# Patient Record
(demographics unavailable — no encounter records)

---

## 2024-10-31 NOTE — ASSESSMENT
[FreeTextEntry1] : Patient is a 76-year-old male with history of dilated, cardiomyopathy of unclear etiology, left ventricular dysfunction with reduced ejection fraction, V-fib status post defibrillator, history of ulcerative colitis, history of hypertension, history of prediabetes, achalasia, who presents for follow-up  1.  Cardiomyopathy/recent left ventricular dysfunction with reduced ejection fraction Clinically doing well check electrolytes BMP follow-up with cardiology Recent echo showed  Continue Entresto carvedilol and spironolactone   2.  V-fib Status post defibrillator continue mexiletine 150 mg once a day  #3 history of achalasia Asymptomatic continue pantoprazole 40 mg once a day  5 history of ulcerative colitis Asymptomatic colonoscopy negative  6.  Health maintenance Prevnar 20 and high-dose flu shot today check labs follow-up in 3 to 4 months

## 2024-10-31 NOTE — HISTORY OF PRESENT ILLNESS
[FreeTextEntry1] : Follow-up for achalasia ulcerative colitis, LV dysfunction with reduced ejection fraction V-fib [de-identified] : The patient is a 76-year-old male with history of cardiomyopathy LV dysfunction with reduced ejection fraction, V-fib status post defibrillator, history of ulcerative colitis in remission, achalasia status post dilatations, history of overweight, history of hypertension, prediabetes, who presents for follow-up patient feels well denies chest pain, shortness of breath dyspnea exertion palpitation lightheadedness dizziness nausea vomiting constipation diarrhea feels well no more groin or breast tenderness.

## 2025-01-29 NOTE — HISTORY OF PRESENT ILLNESS
[FreeTextEntry1] : I had the pleasure of seeing your patient Charles Apodaca today in the arrhythmia clinic of [facility].  As you well know the patient is a delightful 75-year-old gentleman with a history of nonischemic cardiomyopathy.  He was status postplacement of a [employer] biventricular defibrillator in 2010.  On follow-up it was noted that he had a narrow QRS.  He underwent a generator change in 2015 and subsequently developed ICD lead failure with fracture and noise on the lead.  After a long discussion, the patient opted to undergo complete extraction with reimplantation.  He did not require LV pacing; he therefore underwent extraction with reimplantation of a new dual-chamber Abbott ICD system on 2/18/20.   The patient suffered a syncopal episode on April 28, 2023.  He was found to have ventricular fibrillation which was successfully terminated with a 30 J shock.  At that time, he underwent catheterization which showed no significant coronary artery disease.  Mexiletine was added to his medical regiment and his Vasotec and spironolactone were changed to Entresto.  He had done well in follow-up without any further ventricular arrhythmias.  However, on remote monitoring he was found to have noise on his atrial lead.  At that time, he was atrially pacing 10% of the time and ventricular pacing 1.9% of the time.  The episodes apparently began in September 2023 but had increased through the end of October.  The patient was brought to clinic and his device reprogrammed to VVI 40.  He returns to clinic today for follow-up.  Today in clinic the patient reports overall doing well.  He continues to feel tired but attributes this to a recent illness.  He denies any shortness of breath, palpitations, lightheadedness, presyncope or syncope.  His blood pressure today was 105/67 and his pulse was 78 and regular.  His EKG revealed sinus rhythm with nonspecific QRS widening with a heart rate of 74 bpm.  Interrogation of his Abbott dual-chamber defibrillator reveals that there continues to be noise on the atrial lead.  However, he is V pacing less than 1% of the time and does not require atrial pacing.  The P wave was 3.7 mV with impedance of 360 ohms.  The R wave was 11.4 mV with impedance of 410 ohms and a threshold of 0.75 V at 0.5 ms.  The battery voltage estimates a longevity of 4.1 years.

## 2025-01-29 NOTE — END OF VISIT
[Time Spent: ___ minutes] : I have spent [unfilled] minutes of time on the encounter which excludes teaching and separately reported services. [] : Fellow [FreeTextEntry3] : continue current therapy

## 2025-01-29 NOTE — DISCUSSION/SUMMARY
[FreeTextEntry1] : In summary the patient is a 75-year-old gentleman with dilated cardiomyopathy status post dual-chamber Abbott ICD implantation.  The patient did have an episode of ventricular fibrillation but has been quiet since his heart failure medicines have been adjusted and he has been on mexiletine.  He does have noise on his atrial lead however he has no atrial pacing requirements and remains on VVI 40 mode.  -Continue Carvedilol 25 mg BID -Continue Mexiletine 150 mg BID -Continue GDMT for HF

## 2025-01-29 NOTE — HISTORY OF PRESENT ILLNESS
[FreeTextEntry1] : I had the pleasure of seeing your patient Charles Apodaca today in the arrhythmia clinic of [facility].  As you well know the patient is a delightful 75-year-old gentleman with a history of nonischemic cardiomyopathy.  He was status postplacement of a [employer] biventricular defibrillator in 2010.  On follow-up it was noted that he had a narrow QRS.  He underwent a generator change in 2015 and subsequently developed ICD lead failure with fracture and noise on the lead.  After a long discussion, the patient opted to undergo complete extraction with reimplantation.  He did not require LV pacing; he therefore underwent extraction with reimplantation of a new dual-chamber Abbott ICD system on 2/18/20.   The patient suffered a syncopal episode on April 28, 2023.  He was found to have ventricular fibrillation which was successfully terminated with a 30 J shock.  At that time, he underwent catheterization which showed no significant coronary artery disease.  Mexiletine was added to his medical regiment and his Vasotec and spironolactone were changed to Entresto.  He had done well in follow-up without any further ventricular arrhythmias.  However, on remote monitoring he was found to have noise on his atrial lead.  At that time, he was atrially pacing 10% of the time and ventricular pacing 1.9% of the time.  The episodes apparently began in September 2023 but had increased through the end of October.  The patient was brought to clinic and his device reprogrammed to VVI 40.  He returns to clinic today for follow-up.  Today in clinic the patient reports overall doing well.  He continues to feel tired but attributes this to a recent illness.  He denies any shortness of breath, palpitations, lightheadedness, presyncope or syncope.  His blood pressure today was 105/67 and his pulse was 78 and regular.  His EKG revealed sinus rhythm with nonspecific QRS widening with a heart rate of 74 bpm.  Interrogation of his Abbott dual-chamber defibrillator reveals that there continues to be noise on the atrial lead.  However, he is V pacing less than 1% of the time and does not require atrial pacing.  The P wave was 3.7 mV with impedance of 360 ohms.  The R wave was 11.4 mV with impedance of 410 ohms and a threshold of 0.75 V at 0.5 ms.  The battery voltage estimates a longevity of 4.1 years.

## 2025-04-02 NOTE — PLAN
[FreeTextEntry1] : Further instructions pending lab result Continue medications follow up With Cardiologist

## 2025-04-02 NOTE — HEALTH RISK ASSESSMENT
[Good] : ~his/her~  mood as  good [No] : In the past 12 months have you used drugs other than those required for medical reasons? No [No falls in past year] : Patient reported no falls in the past year [0] : 2) Feeling down, depressed, or hopeless: Not at all (0) [PHQ-2 Negative - No further assessment needed] : PHQ-2 Negative - No further assessment needed [Never] : Never [With Family] : lives with family [Retired] : retired [] :  [Feels Safe at Home] : Feels safe at home [Fully functional (bathing, dressing, toileting, transferring, walking, feeding)] : Fully functional (bathing, dressing, toileting, transferring, walking, feeding) [Fully functional (using the telephone, shopping, preparing meals, housekeeping, doing laundry, using] : Fully functional and needs no help or supervision to perform IADLs (using the telephone, shopping, preparing meals, housekeeping, doing laundry, using transportation, managing medications and managing finances) [Smoke Detector] : smoke detector [Carbon Monoxide Detector] : carbon monoxide detector [Safety elements used in home] : safety elements used in home [Sunscreen] : uses sunscreen [[id number]] : 0 [Reports changes in hearing] : Reports no changes in hearing [Travel to Developing Areas] : does not  travel to developing areas [TB Exposure] : is not being exposed to tuberculosis

## 2025-04-02 NOTE — PHYSICAL EXAM
[No Acute Distress] : no acute distress [Well Nourished] : well nourished [Well Developed] : well developed [Well-Appearing] : well-appearing [Normal Sclera/Conjunctiva] : normal sclera/conjunctiva [PERRL] : pupils equal round and reactive to light [EOMI] : extraocular movements intact [Normal Outer Ear/Nose] : the outer ears and nose were normal in appearance [Normal Oropharynx] : the oropharynx was normal [Normal TMs] : both tympanic membranes were normal [No JVD] : no jugular venous distention [No Lymphadenopathy] : no lymphadenopathy [Supple] : supple [Thyroid Normal, No Nodules] : the thyroid was normal and there were no nodules present [No Respiratory Distress] : no respiratory distress  [No Accessory Muscle Use] : no accessory muscle use [Clear to Auscultation] : lungs were clear to auscultation bilaterally [Normal Rate] : normal rate  [Regular Rhythm] : with a regular rhythm [Normal S1, S2] : normal S1 and S2 [No Murmur] : no murmur heard [No Carotid Bruits] : no carotid bruits [No Abdominal Bruit] : a ~M bruit was not heard ~T in the abdomen [No Varicosities] : no varicosities [Pedal Pulses Present] : the pedal pulses are present [No Edema] : there was no peripheral edema [No Palpable Aorta] : no palpable aorta [No Extremity Clubbing/Cyanosis] : no extremity clubbing/cyanosis [Normal Appearance] : normal in appearance [Soft] : abdomen soft [Non Tender] : non-tender [Non-distended] : non-distended [No Masses] : no abdominal mass palpated [No HSM] : no HSM [Normal Bowel Sounds] : normal bowel sounds [Declined Rectal Exam] : declined rectal exam [Normal Supraclavicular Nodes] : no supraclavicular lymphadenopathy [Normal Posterior Cervical Nodes] : no posterior cervical lymphadenopathy [Normal Anterior Cervical Nodes] : no anterior cervical lymphadenopathy [No CVA Tenderness] : no CVA  tenderness [No Spinal Tenderness] : no spinal tenderness [No Joint Swelling] : no joint swelling [Grossly Normal Strength/Tone] : grossly normal strength/tone [No Rash] : no rash [Coordination Grossly Intact] : coordination grossly intact [No Focal Deficits] : no focal deficits [Normal Gait] : normal gait [Deep Tendon Reflexes (DTR)] : deep tendon reflexes were 2+ and symmetric [Speech Grossly Normal] : speech grossly normal [Memory Grossly Normal] : memory grossly normal [Normal Affect] : the affect was normal [Alert and Oriented x3] : oriented to person, place, and time [Normal Mood] : the mood was normal [Normal Insight/Judgement] : insight and judgment were intact [de-identified] : declined

## 2025-04-02 NOTE — HISTORY OF PRESENT ILLNESS
[FreeTextEntry1] : Annual Physical [de-identified] : LENYN NDIAYE is a 76 year old M who presents today for annual Physical sees Cardiologist has Pacemaker Defibrillator  has HTN Here to establish care last Colonoscopy 2 years ago

## 2025-04-30 NOTE — DISCUSSION/SUMMARY
[Cardiomyopathy] : cardiomyopathy [Hypertension] : hypertension [Stable] : stable [Patient] : the patient [___ Month(s)] : in [unfilled] month(s) [Procedure Low Risk] : the procedure risk is low [Patient Intermediate Risk] : the patient is an intermediate risk [Optimized for Surgery] : the patient is optimized for surgery [As per surgery] : as per surgery [Continue] : Continue medications as currently directed [FreeTextEntry1] : He will begin to increase his usual activities slowly He had a repeat echo which showed a reduced ejection fraction of 25% He is asymptomatic and functioning normally. EKG unchanged [EKG obtained to assist in diagnosis and management of assessed problem(s)] : EKG obtained to assist in diagnosis and management of assessed problem(s)

## 2025-04-30 NOTE — HISTORY OF PRESENT ILLNESS
[Preoperative Visit] : for a medical evaluation prior to surgery [Scheduled Procedure ___] : a [unfilled] [Date of Surgery ___] : on [unfilled] [Good] : Good [Poor Exercise Tolerance] : poor exercise tolerance [Cardiovascular Disease] : cardiovascular disease [Prior Anesthesia] : Prior anesthesia [Doing Well] : doing well [None] : The patient has had no significant interval symptoms [Fever] : no fever [Chills] : no chills [Fatigue] : no fatigue [Chest Pain] : no chest pain [Cough] : no cough [Dyspnea] : no dyspnea [Dysuria] : no dysuria [Urinary Frequency] : no urinary frequency [Nausea] : no nausea [Vomiting] : no vomiting [Diarrhea] : no diarrhea [Abdominal Pain] : no abdominal pain [Lower Extremity Swelling] : no lower extremity swelling [Diabetes] : no diabetes [Pulmonary Disease] : no pulmonary disease [Anti-Platelet Agents] : no anti-platelet agents [Nicotine Dependence] : no nicotine dependence [Alcohol Use] : no  alcohol use [Renal Disease] : no renal disease [GI Disease] : no gastrointestinal disease [Sleep Apnea] : no sleep apnea [Thromboembolic Problems] : no thromboembolic problems [Clotting Disorder] : no clotting disorder [Frequent use of NSAIDs] : no use of NSAIDs [Bleeding Disorder] : no bleeding disorder [Transfusion Reaction] : no transfusion reaction [Impaired Immunity] : no impaired immunity [Steroid Use in Last 6 Months] : no steroid use in the last six months [Frequent Aspirin Use] : no frequent aspirin use [Prev Anesthesia Reaction] : no previous anesthesia reaction [FreeTextEntry1] : He has no chest pain He has more shortness of breath He has no palpitations He has no syncope He has no edema He is neurologically intact He has no GI symptoms [Syncope] : no syncope [PND] : no PND [Easy Bleeding] : no tendency for easy bleeding [Easy Bruising] : no tendency for easy bruising [TIA Symptoms] : no TIA symptoms [Wt Gain ___ Lbs] : no recent weight gain [Symptoms Limit Activities] : ~His/Her~ symptoms do not limit ~his/her~ activities

## 2025-04-30 NOTE — REASON FOR VISIT
[Structural Heart and Valve Disease] : structural heart and valve disease [Follow-Up - Clinic] : a clinic follow-up of [AICD Check] : implantable cardioverter-defibrillator [Cardiomyopathy] : cardiomyopathy [Hypertension] : hypertension [FreeTextEntry1] : I saw this 76-year-old man in followup on  04/30/25 He has a history of a cardiomyopathy with an AICD.for more than 10 years.  X ago he had a syncopal episode and thought nothing of it but his defibrillator reported that he was shocked.  He was seen at [facility] and had coronary angiography which was normal. Sided pressures were normal.  Mixed venous sat was normal He was started on Entresto, spironolactone, and mexiletine. He seems to be doing well on his medication and wishes to return to the gym.

## 2025-04-30 NOTE — PHYSICAL EXAM
[General Appearance - Well Developed] : well developed [Normal Appearance] : normal appearance [Well Groomed] : well groomed [General Appearance - Well Nourished] : well nourished [No Deformities] : no deformities [General Appearance - In No Acute Distress] : no acute distress [Normal Conjunctiva] : the conjunctiva exhibited no abnormalities [Eyelids - No Xanthelasma] : the eyelids demonstrated no xanthelasmas [Normal Oral Mucosa] : normal oral mucosa [No Oral Pallor] : no oral pallor [No Oral Cyanosis] : no oral cyanosis [Normal Jugular Venous A Waves Present] : normal jugular venous A waves present [Normal Jugular Venous V Waves Present] : normal jugular venous V waves present [No Jugular Venous Rodas A Waves] : no jugular venous rodas A waves [Respiration, Rhythm And Depth] : normal respiratory rhythm and effort [Exaggerated Use Of Accessory Muscles For Inspiration] : no accessory muscle use [Auscultation Breath Sounds / Voice Sounds] : lungs were clear to auscultation bilaterally [Heart Rate And Rhythm] : heart rate and rhythm were normal [Heart Sounds] : normal S1 and S2 [Diastolic Grade ___/4] : A grade [unfilled]/4 diastolic murmur was heard. [Bowel Sounds] : normal bowel sounds [Abdomen Soft] : soft [Abdomen Tenderness] : non-tender [Abdomen Mass (___ Cm)] : no abdominal mass palpated [Abnormal Walk] : normal gait [Gait - Sufficient For Exercise Testing] : the gait was sufficient for exercise testing [Nail Clubbing] : no clubbing of the fingernails [Cyanosis, Localized] : no localized cyanosis [Petechial Hemorrhages (___cm)] : no petechial hemorrhages [Skin Color & Pigmentation] : normal skin color and pigmentation [] : no rash [No Venous Stasis] : no venous stasis [Skin Lesions] : no skin lesions [No Skin Ulcers] : no skin ulcer [No Xanthoma] : no  xanthoma was observed [Oriented To Time, Place, And Person] : oriented to person, place, and time [Affect] : the affect was normal [Mood] : the mood was normal [No Anxiety] : not feeling anxious